# Patient Record
Sex: FEMALE | Race: WHITE | URBAN - METROPOLITAN AREA
[De-identification: names, ages, dates, MRNs, and addresses within clinical notes are randomized per-mention and may not be internally consistent; named-entity substitution may affect disease eponyms.]

---

## 2019-02-26 ENCOUNTER — APPOINTMENT (RX ONLY)
Dept: URBAN - METROPOLITAN AREA CLINIC 15 | Facility: CLINIC | Age: 84
Setting detail: DERMATOLOGY
End: 2019-02-26

## 2019-02-26 DIAGNOSIS — L81.4 OTHER MELANIN HYPERPIGMENTATION: ICD-10-CM

## 2019-02-26 DIAGNOSIS — L82.1 OTHER SEBORRHEIC KERATOSIS: ICD-10-CM

## 2019-02-26 DIAGNOSIS — D18.0 HEMANGIOMA: ICD-10-CM

## 2019-02-26 PROBLEM — I10 ESSENTIAL (PRIMARY) HYPERTENSION: Status: ACTIVE | Noted: 2019-02-26

## 2019-02-26 PROBLEM — D18.01 HEMANGIOMA OF SKIN AND SUBCUTANEOUS TISSUE: Status: ACTIVE | Noted: 2019-02-26

## 2019-02-26 PROCEDURE — 99202 OFFICE O/P NEW SF 15 MIN: CPT | Mod: 25

## 2019-02-26 PROCEDURE — ? COUNSELING

## 2019-02-26 PROCEDURE — ? SHAVE REMOVAL (NO PATHOLOGY)

## 2019-02-26 PROCEDURE — 11300 SHAVE SKIN LESION 0.5 CM/<: CPT

## 2019-02-26 PROCEDURE — ? SUNSCREEN RECOMMENDATIONS

## 2019-02-26 ASSESSMENT — LOCATION SIMPLE DESCRIPTION DERM
LOCATION SIMPLE: CHEST
LOCATION SIMPLE: LEFT UPPER BACK
LOCATION SIMPLE: LEFT UPPER BACK
LOCATION SIMPLE: RIGHT CLAVICULAR SKIN
LOCATION SIMPLE: RIGHT UPPER BACK
LOCATION SIMPLE: UPPER BACK

## 2019-02-26 ASSESSMENT — LOCATION DETAILED DESCRIPTION DERM
LOCATION DETAILED: INFERIOR THORACIC SPINE
LOCATION DETAILED: LEFT MEDIAL SUPERIOR CHEST
LOCATION DETAILED: LEFT MID-UPPER BACK
LOCATION DETAILED: LEFT MEDIAL UPPER BACK
LOCATION DETAILED: RIGHT MID-UPPER BACK
LOCATION DETAILED: LEFT INFERIOR UPPER BACK
LOCATION DETAILED: LEFT SUPERIOR LATERAL UPPER BACK
LOCATION DETAILED: LEFT SUPERIOR UPPER BACK
LOCATION DETAILED: RIGHT CLAVICULAR SKIN

## 2019-02-26 ASSESSMENT — LOCATION ZONE DERM
LOCATION ZONE: TRUNK
LOCATION ZONE: TRUNK

## 2019-02-26 NOTE — PROCEDURE: SHAVE REMOVAL (NO PATHOLOGY)
Anesthesia Type: 1% lidocaine with epinephrine
X Size Of Lesion In Cm (Optional): 0
Include Z78.9 (Other Specified Conditions Influencing Health Status) As An Associated Diagnosis?: No
Medical Necessity Clause: This procedure was medically necessary because the lesion that was treated was: pt applying tag away that irritates
Anesthesia Volume In Cc: 0.2
Detail Level: Detailed
Post-Care Instructions: I reviewed with the patient in detail post-care instructions. Patient is to keep the biopsy site dry overnight, and then apply bacitracin twice daily until healed. Patient may apply hydrogen peroxide soaks to remove any crusting.
Medical Necessity Information: It is in your best interest to select a reason for this procedure from the list below. All of these items fulfill various CMS LCD requirements except the new and changing color options.
Consent was obtained from the patient. The risks and benefits to therapy were discussed in detail. Specifically, the risks of infection, scarring, bleeding, prolonged wound healing, incomplete removal, allergy to anesthesia, nerve injury and recurrence were addressed. Prior to the procedure, the treatment site was clearly identified and confirmed by the patient. All components of Universal Protocol/PAUSE Rule completed.
Hemostasis: Electrocautery
Path Notes (To The Dermatopathologist): Please check margins.
Wound Care: Petrolatum

## 2019-10-29 ENCOUNTER — APPOINTMENT (RX ONLY)
Dept: URBAN - METROPOLITAN AREA CLINIC 15 | Facility: CLINIC | Age: 84
Setting detail: DERMATOLOGY
End: 2019-10-29

## 2019-10-29 DIAGNOSIS — M71 OTHER BURSOPATHIES: ICD-10-CM

## 2019-10-29 PROBLEM — M71.342 OTHER BURSAL CYST, LEFT HAND: Status: ACTIVE | Noted: 2019-10-29

## 2019-10-29 PROCEDURE — ? COUNSELING

## 2019-10-29 PROCEDURE — 11421 EXC H-F-NK-SP B9+MARG 0.6-1: CPT

## 2019-10-29 PROCEDURE — ? EXCISION (NO PATHOLOGY)

## 2019-10-29 ASSESSMENT — LOCATION SIMPLE DESCRIPTION DERM: LOCATION SIMPLE: LEFT MIDDLE FINGER

## 2019-10-29 ASSESSMENT — LOCATION DETAILED DESCRIPTION DERM: LOCATION DETAILED: LEFT MID DORSAL MIDDLE FINGER

## 2019-10-29 ASSESSMENT — LOCATION ZONE DERM: LOCATION ZONE: FINGER

## 2019-10-29 NOTE — PROCEDURE: EXCISION (NO PATHOLOGY)
Anesthesia Volume In Cc: 0
Render Post-Care Instructions In Note?: yes
Medical Necessity Information: It is in your best interest to select a reason for this procedure from the list below. All of these items fulfill various CMS LCD requirements except lesion extends to a margin.
Post-Care Instructions: I reviewed with the patient in detail post-care instructions. Patient is not to engage in any heavy lifting, exercise, or swimming for the next 14 days. Should the patient develop any fevers, chills, bleeding, severe pain patient will contact the office immediately.
Dressing: dry sterile dressing
Anesthesia Volume In Cc: 1
Deep Sutures: 4-0 Vicryl
Medical Necessity Clause: This procedure was medically necessary because the lesion that was treated was:
Complex Repair Preamble Text (Leave Blank If You Do Not Want): Extensive wide undermining was performed.
Include Z78.9 (Other Specified Conditions Influencing Health Status) As An Associated Diagnosis?: No
Intermediate Repair Preamble Text (Leave Blank If You Do Not Want): Undermining was performed with blunt dissection.
Excision Depth: adipose tissue
Repair Type: None (Simple)
Size Of Lesion In Cm: 0.6
Fusiform Excision Additional Text (Leave Blank If You Do Not Want): The margin was drawn around the clinically apparent lesion.  A fusiform shape was then drawn on the skin incorporating the lesion and margins.  Incisions were then made along these lines to the appropriate tissue plane and the lesion was extirpated.
Additional Anesthesia Volume In Cc: 0.9
Eliptical Excision Additional Text (Leave Blank If You Do Not Want): The margin was drawn around the clinically apparent lesion.  An elliptical shape was then drawn on the skin incorporating the lesion and margins.  Incisions were then made along these lines to the appropriate tissue plane and the lesion was extirpated.
Scalpel Size: 15 blade
Epidermal Closure: simple interrupted
Hemostasis: Electrocautery
Size Of Margin In Cm: 0.1
Suture Removal: 7 days
Saucerization Excision Additional Text (Leave Blank If You Do Not Want): The margin was drawn around the clinically apparent lesion.  Incisions were then made along these lines, in a tangential fashion, to the appropriate tissue plane and the lesion was extirpated.
Epidermal Sutures: 5-0 Ethilon
Slit Excision Additional Text (Leave Blank If You Do Not Want): A linear line was drawn on the skin overlying the lesion. An incision was made slowly until the lesion was visualized.  Once visualized, the lesion was removed with blunt dissection.
Anesthesia Type: 1% lidocaine with epinephrine
Wound Care: Petrolatum
Detail Level: Detailed
Estimated Blood Loss (Cc): minimal
Excision Method: Slit
Consent was obtained from the patient. The risks and benefits to therapy were discussed in detail. Specifically, the risks of infection, scarring, bleeding, prolonged wound healing, incomplete removal, allergy to anesthesia, nerve injury and recurrence were addressed. Prior to the procedure, the treatment site was clearly identified and confirmed by the patient. All components of Universal Protocol/PAUSE Rule completed.
Anesthesia Type: 1% lidocaine without epinephrine

## 2019-11-05 ENCOUNTER — APPOINTMENT (RX ONLY)
Dept: URBAN - METROPOLITAN AREA CLINIC 15 | Facility: CLINIC | Age: 84
Setting detail: DERMATOLOGY
End: 2019-11-05

## 2019-11-05 DIAGNOSIS — Z48.02 ENCOUNTER FOR REMOVAL OF SUTURES: ICD-10-CM

## 2019-11-05 PROCEDURE — ? SUTURE REMOVAL (GLOBAL PERIOD)

## 2019-11-05 PROCEDURE — 99024 POSTOP FOLLOW-UP VISIT: CPT

## 2019-11-05 ASSESSMENT — LOCATION SIMPLE DESCRIPTION DERM: LOCATION SIMPLE: LEFT MIDDLE FINGER

## 2019-11-05 ASSESSMENT — LOCATION DETAILED DESCRIPTION DERM: LOCATION DETAILED: LEFT MIDDLE DISTAL INTERPHALANGEAL JOINT

## 2019-11-05 ASSESSMENT — LOCATION ZONE DERM: LOCATION ZONE: FINGER

## 2019-11-05 NOTE — HPI: CYST
How Severe Is Your Cyst?: severe
Medication(s) Requested: alprazolam  Last office visit: 11/8/17  Next office visit 2/17/18  Last refill: 10/17/17  Is the patient due for refill of this medication(s): Yes  PDMP review: Criteria met. Forwarded to Physician/RACHEL for signature.     
Is This A New Presentation, Or A Follow-Up?: Follow Up Cyst

## 2019-11-05 NOTE — PROCEDURE: SUTURE REMOVAL (GLOBAL PERIOD)
Detail Level: Detailed
Add 33404 Cpt? (Important Note: In 2017 The Use Of 53354 Is Being Tracked By Cms To Determine Future Global Period Reimbursement For Global Periods): yes

## 2019-11-11 ENCOUNTER — APPOINTMENT (RX ONLY)
Dept: URBAN - METROPOLITAN AREA CLINIC 17 | Facility: CLINIC | Age: 84
Setting detail: DERMATOLOGY
End: 2019-11-11

## 2019-11-11 DIAGNOSIS — Z48.817 ENCOUNTER FOR SURGICAL AFTERCARE FOLLOWING SURGERY ON THE SKIN AND SUBCUTANEOUS TISSUE: ICD-10-CM

## 2019-11-11 PROCEDURE — ? POST-OP WOUND CHECK

## 2019-11-11 PROCEDURE — ? ADDITIONAL NOTES

## 2019-11-11 ASSESSMENT — LOCATION ZONE DERM: LOCATION ZONE: FINGER

## 2019-11-11 ASSESSMENT — LOCATION DETAILED DESCRIPTION DERM: LOCATION DETAILED: LEFT MID DORSAL MIDDLE FINGER

## 2019-11-11 ASSESSMENT — LOCATION SIMPLE DESCRIPTION DERM: LOCATION SIMPLE: LEFT MIDDLE FINGER

## 2019-11-11 NOTE — PROCEDURE: POST-OP WOUND CHECK
Detail Level: Detailed
Add 82555 Cpt? (Important Note: In 2017 The Use Of 78954 Is Being Tracked By Cms To Determine Future Global Period Reimbursement For Global Periods): no